# Patient Record
Sex: FEMALE | Race: WHITE | URBAN - NONMETROPOLITAN AREA
[De-identification: names, ages, dates, MRNs, and addresses within clinical notes are randomized per-mention and may not be internally consistent; named-entity substitution may affect disease eponyms.]

---

## 2019-11-20 ENCOUNTER — IMPORTED ENCOUNTER (OUTPATIENT)
Dept: URBAN - NONMETROPOLITAN AREA CLINIC 1 | Facility: CLINIC | Age: 44
End: 2019-11-20

## 2019-11-20 PROBLEM — H00.024: Noted: 2019-11-20

## 2019-11-20 PROCEDURE — 99203 OFFICE O/P NEW LOW 30 MIN: CPT

## 2021-04-02 NOTE — PATIENT DISCUSSION
Discussed that opacity is the cause of the decreased vision. Discussed the risks and benefits of performing a YAG Capsulotomy including the risk of floaters, retinal detachment, lens dislocation, and retinal swelling.

## 2021-04-02 NOTE — PATIENT DISCUSSION
Patient understands to expect floaters after the YAG capsulotomy procedures and understands that they may remain indefinitely. All questions answered and handout given. Pt wishes to proceed. Schedule Yag Cap OS, then OD, and 2 week PO appt.

## 2022-04-15 ASSESSMENT — TONOMETRY: OD_IOP_MMHG: 15

## 2022-04-15 ASSESSMENT — VISUAL ACUITY
OS_PH: 20/40
OD_SC: 20/20
OS_SC: 20/60